# Patient Record
Sex: FEMALE | Race: ASIAN | NOT HISPANIC OR LATINO
[De-identification: names, ages, dates, MRNs, and addresses within clinical notes are randomized per-mention and may not be internally consistent; named-entity substitution may affect disease eponyms.]

---

## 2017-04-27 ENCOUNTER — APPOINTMENT (OUTPATIENT)
Dept: ORTHOPEDIC SURGERY | Facility: CLINIC | Age: 52
End: 2017-04-27

## 2017-04-27 RX ORDER — NAPROXEN 500 MG/1
500 TABLET ORAL TWICE DAILY
Qty: 60 | Refills: 0 | Status: ACTIVE | COMMUNITY
Start: 2017-04-27 | End: 1900-01-01

## 2017-06-01 ENCOUNTER — APPOINTMENT (OUTPATIENT)
Dept: ORTHOPEDIC SURGERY | Facility: CLINIC | Age: 52
End: 2017-06-01

## 2017-07-03 ENCOUNTER — APPOINTMENT (OUTPATIENT)
Dept: ORTHOPEDIC SURGERY | Facility: CLINIC | Age: 52
End: 2017-07-03

## 2017-07-03 DIAGNOSIS — M25.562 PAIN IN LEFT KNEE: ICD-10-CM

## 2017-07-06 ENCOUNTER — APPOINTMENT (OUTPATIENT)
Dept: MRI IMAGING | Facility: CLINIC | Age: 52
End: 2017-07-06

## 2017-07-06 ENCOUNTER — OUTPATIENT (OUTPATIENT)
Dept: OUTPATIENT SERVICES | Facility: HOSPITAL | Age: 52
LOS: 1 days | End: 2017-07-06
Payer: COMMERCIAL

## 2017-07-06 DIAGNOSIS — M25.562 PAIN IN LEFT KNEE: ICD-10-CM

## 2017-07-06 PROCEDURE — 73721 MRI JNT OF LWR EXTRE W/O DYE: CPT

## 2017-08-01 ENCOUNTER — APPOINTMENT (OUTPATIENT)
Dept: ORTHOPEDIC SURGERY | Facility: CLINIC | Age: 52
End: 2017-08-01
Payer: COMMERCIAL

## 2017-08-01 PROCEDURE — 99214 OFFICE O/P EST MOD 30 MIN: CPT | Mod: 25

## 2017-08-02 ENCOUNTER — OTHER (OUTPATIENT)
Age: 52
End: 2017-08-02

## 2017-10-09 ENCOUNTER — APPOINTMENT (OUTPATIENT)
Dept: ORTHOPEDIC SURGERY | Facility: CLINIC | Age: 52
End: 2017-10-09
Payer: COMMERCIAL

## 2017-10-09 PROCEDURE — 99213 OFFICE O/P EST LOW 20 MIN: CPT

## 2018-03-12 ENCOUNTER — APPOINTMENT (OUTPATIENT)
Dept: ORTHOPEDIC SURGERY | Facility: CLINIC | Age: 53
End: 2018-03-12
Payer: COMMERCIAL

## 2018-03-12 DIAGNOSIS — S83.232D COMPLEX TEAR OF MEDIAL MENISCUS, CURRENT INJURY, LEFT KNEE, SUBSEQUENT ENCOUNTER: ICD-10-CM

## 2018-03-12 PROCEDURE — 99213 OFFICE O/P EST LOW 20 MIN: CPT

## 2020-02-05 ENCOUNTER — NON-APPOINTMENT (OUTPATIENT)
Age: 55
End: 2020-02-05

## 2020-02-05 ENCOUNTER — APPOINTMENT (OUTPATIENT)
Dept: CARDIOLOGY | Facility: CLINIC | Age: 55
End: 2020-02-05
Payer: COMMERCIAL

## 2020-02-05 VITALS
WEIGHT: 136 LBS | RESPIRATION RATE: 18 BRPM | TEMPERATURE: 97.7 F | DIASTOLIC BLOOD PRESSURE: 75 MMHG | SYSTOLIC BLOOD PRESSURE: 115 MMHG | HEART RATE: 80 BPM | BODY MASS INDEX: 24.09 KG/M2 | OXYGEN SATURATION: 98 %

## 2020-02-05 DIAGNOSIS — R42 DIZZINESS AND GIDDINESS: ICD-10-CM

## 2020-02-05 DIAGNOSIS — R07.89 OTHER CHEST PAIN: ICD-10-CM

## 2020-02-05 PROCEDURE — 99204 OFFICE O/P NEW MOD 45 MIN: CPT | Mod: 25

## 2020-02-05 PROCEDURE — 93000 ELECTROCARDIOGRAM COMPLETE: CPT | Mod: 59

## 2020-02-05 NOTE — REASON FOR VISIT
[FreeTextEntry1] : 54 year-old female with HLD presents for evaluation of dizziness. Patient reports that she feels dizzy as if her head is not clear and whenever she turns her head she feel unwell. She had seen neurologist in 2011 and w/u was normal. Patient reports L sided CP and SOB with exertion that feels like tightness. Patient denies h/o syncope. Patient reports occasional palpitations lasting seconds. Patient reports that ever since she had nasal polyp removed she is breathing much better. She is not sure if she snores. I advised patient to undergo an echocardiogram and a treadmill stress test.

## 2020-02-05 NOTE — PHYSICAL EXAM
[General Appearance - Well Developed] : well developed [Normal Appearance] : normal appearance [Well Groomed] : well groomed [General Appearance - Well Nourished] : well nourished [No Deformities] : no deformities [General Appearance - In No Acute Distress] : no acute distress [Normal Conjunctiva] : the conjunctiva exhibited no abnormalities [Eyelids - No Xanthelasma] : the eyelids demonstrated no xanthelasmas [Normal Oral Mucosa] : normal oral mucosa [No Oral Pallor] : no oral pallor [No Oral Cyanosis] : no oral cyanosis [Normal Jugular Venous A Waves Present] : normal jugular venous A waves present [Normal Jugular Venous V Waves Present] : normal jugular venous V waves present [No Jugular Venous Mohamud A Waves] : no jugular venous mohamud A waves [Heart Rate And Rhythm] : heart rate and rhythm were normal [Heart Sounds] : normal S1 and S2 [Murmurs] : no murmurs present [Respiration, Rhythm And Depth] : normal respiratory rhythm and effort [Exaggerated Use Of Accessory Muscles For Inspiration] : no accessory muscle use [Auscultation Breath Sounds / Voice Sounds] : lungs were clear to auscultation bilaterally [Abdomen Soft] : soft [Abdomen Tenderness] : non-tender [Abdomen Mass (___ Cm)] : no abdominal mass palpated [Abnormal Walk] : normal gait [Gait - Sufficient For Exercise Testing] : the gait was sufficient for exercise testing [Nail Clubbing] : no clubbing of the fingernails [Cyanosis, Localized] : no localized cyanosis [Petechial Hemorrhages (___cm)] : no petechial hemorrhages [] : no ischemic changes [Oriented To Time, Place, And Person] : oriented to person, place, and time [Affect] : the affect was normal [Mood] : the mood was normal [No Anxiety] : not feeling anxious

## 2020-02-07 PROBLEM — R42 DIZZINESS: Status: ACTIVE | Noted: 2020-02-07

## 2020-02-22 ENCOUNTER — APPOINTMENT (OUTPATIENT)
Dept: CARDIOLOGY | Facility: CLINIC | Age: 55
End: 2020-02-22

## 2020-09-22 ENCOUNTER — APPOINTMENT (OUTPATIENT)
Dept: PULMONOLOGY | Facility: CLINIC | Age: 55
End: 2020-09-22

## 2020-10-08 ENCOUNTER — APPOINTMENT (OUTPATIENT)
Dept: PULMONOLOGY | Facility: CLINIC | Age: 55
End: 2020-10-08
Payer: COMMERCIAL

## 2020-10-08 VITALS
WEIGHT: 131 LBS | BODY MASS INDEX: 23.21 KG/M2 | DIASTOLIC BLOOD PRESSURE: 70 MMHG | OXYGEN SATURATION: 99 % | TEMPERATURE: 97.6 F | SYSTOLIC BLOOD PRESSURE: 105 MMHG | HEART RATE: 71 BPM | HEIGHT: 63 IN

## 2020-10-08 DIAGNOSIS — Z80.1 FAMILY HISTORY OF MALIGNANT NEOPLASM OF TRACHEA, BRONCHUS AND LUNG: ICD-10-CM

## 2020-10-08 DIAGNOSIS — G47.00 INSOMNIA, UNSPECIFIED: ICD-10-CM

## 2020-10-08 PROCEDURE — 99204 OFFICE O/P NEW MOD 45 MIN: CPT

## 2020-10-08 RX ORDER — CLONAZEPAM 0.5 MG/1
0.5 TABLET ORAL
Qty: 60 | Refills: 0 | Status: ACTIVE | COMMUNITY
Start: 2020-10-08 | End: 1900-01-01

## 2020-10-08 NOTE — DISCUSSION/SUMMARY
[FreeTextEntry1] : The patient has insomnia and anxiety.  Sleep hygiene techniques were discussed in detail.She was advised to try meditation and Yoga to help with relaxation.\par She will be given Clonazepam to help her sleep. She wants natural meds. Functional approach was discussed as well.

## 2020-10-08 NOTE — HISTORY OF PRESENT ILLNESS
[Never] : never [TextBox_4] : 55 year old lady of [place] descent here the evaluation of insomnia. She has insomnia for 25 years.  It began with a nasal block for many years. She had her nose operation in 2013. She now sleeps sleeps for 4 hours a night.  She goes to bed at 10, sometimes she falls asleep at 12 MN. She sometimes she does not sleep at all. She is very worried about not sleeping at all. She sleeps very lightly. She is worried about the economic effects of COVID.\par She might take short naps some days. \par She does not smoke or drink alcohol.\par She says little things bothers her a lot. She takes her mother's sleep medication she got from [place] on and off. she sleeps well with it. Her PMD gave her melatonin. She helps very little.\par She has stable weight.\par She does not exercise recently as she had hemorrhoidal surgery and she says it is infected.\par H/o hypothyroidism on medications.\par

## 2020-10-29 ENCOUNTER — APPOINTMENT (OUTPATIENT)
Dept: PULMONOLOGY | Facility: CLINIC | Age: 55
End: 2020-10-29

## 2025-05-13 ENCOUNTER — EMERGENCY (EMERGENCY)
Facility: HOSPITAL | Age: 60
LOS: 1 days | End: 2025-05-13
Attending: EMERGENCY MEDICINE
Payer: COMMERCIAL

## 2025-05-13 VITALS
TEMPERATURE: 99 F | RESPIRATION RATE: 16 BRPM | HEART RATE: 72 BPM | SYSTOLIC BLOOD PRESSURE: 155 MMHG | DIASTOLIC BLOOD PRESSURE: 88 MMHG | OXYGEN SATURATION: 98 %

## 2025-05-13 VITALS
DIASTOLIC BLOOD PRESSURE: 84 MMHG | SYSTOLIC BLOOD PRESSURE: 147 MMHG | TEMPERATURE: 99 F | HEART RATE: 69 BPM | WEIGHT: 132.94 LBS | RESPIRATION RATE: 18 BRPM | OXYGEN SATURATION: 97 % | HEIGHT: 63 IN

## 2025-05-13 PROCEDURE — 99284 EMERGENCY DEPT VISIT MOD MDM: CPT

## 2025-05-13 PROCEDURE — 96372 THER/PROPH/DIAG INJ SC/IM: CPT

## 2025-05-13 PROCEDURE — 99283 EMERGENCY DEPT VISIT LOW MDM: CPT | Mod: 25

## 2025-05-13 RX ORDER — KETOROLAC TROMETHAMINE 30 MG/ML
15 INJECTION, SOLUTION INTRAMUSCULAR; INTRAVENOUS ONCE
Refills: 0 | Status: DISCONTINUED | OUTPATIENT
Start: 2025-05-13 | End: 2025-05-13

## 2025-05-13 RX ORDER — IBUPROFEN 200 MG
1 TABLET ORAL
Qty: 28 | Refills: 0
Start: 2025-05-13 | End: 2025-05-19

## 2025-05-13 RX ORDER — METHYLPREDNISOLONE ACETATE 80 MG/ML
28 INJECTION, SUSPENSION INTRA-ARTICULAR; INTRALESIONAL; INTRAMUSCULAR; SOFT TISSUE ONCE
Refills: 0 | Status: DISCONTINUED | OUTPATIENT
Start: 2025-05-13 | End: 2025-05-13

## 2025-05-13 RX ORDER — METHYLPREDNISOLONE ACETATE 80 MG/ML
1 INJECTION, SUSPENSION INTRA-ARTICULAR; INTRALESIONAL; INTRAMUSCULAR; SOFT TISSUE
Qty: 1 | Refills: 0
Start: 2025-05-13 | End: 2025-05-18

## 2025-05-13 RX ADMIN — KETOROLAC TROMETHAMINE 15 MILLIGRAM(S): 30 INJECTION, SOLUTION INTRAMUSCULAR; INTRAVENOUS at 07:59

## 2025-05-13 NOTE — ED PROVIDER NOTE - CLINICAL SUMMARY MEDICAL DECISION MAKING FREE TEXT BOX
60-year-old female presenting with right lateral neck pain radiating to the right shoulder/right arm.  Patient with mild limitation range of motion however no tenderness to palpation of the neck or shoulder, no swelling or sensory deficits pulses intact.    Differential diagnosis includes but is not limited to cervical radiculopathy versus muscle spasm vs cervical strain. plan to tx with medrol dose pack and ibuprofen. pt advised to follow-up with primary care physician this week for further evaluation management.  Patient verbalized understanding of treatment plan and follow-up instructions.  Return precautions given.  All questions answered at this time.  Will medicate and reassess and likely discharge Ramos: 60-year-old female presenting with right lateral neck pain radiating to the right shoulder/right arm.  Patient with mild limitation range of motion however no tenderness to palpation of the neck or shoulder, no swelling or sensory deficits pulses intact.    Differential diagnosis includes but is not limited to cervical radiculopathy versus muscle spasm vs cervical strain. plan to tx with medrol dose pack and ibuprofen. pt advised to follow-up with primary care physician this week for further evaluation management.  Patient verbalized understanding of treatment plan and follow-up instructions.  Return precautions given.  All questions answered at this time.  Patient stable for dc.

## 2025-05-13 NOTE — ED ADULT NURSE NOTE - OBJECTIVE STATEMENT
pt is 60y F, no pertinent pmhx, c/o right shoulder pain, atraumatic, full strength, full ROM, pt 8/10 pain with motion, no chest pain or other symptoms, pt A&Ox4, ambulatory, updated on plan of care

## 2025-05-13 NOTE — ED ADULT NURSE NOTE - NSFALLUNIVINTERV_ED_ALL_ED
Bed/Stretcher in lowest position, wheels locked, appropriate side rails in place/Call bell, personal items and telephone in reach/Instruct patient to call for assistance before getting out of bed/chair/stretcher/Non-slip footwear applied when patient is off stretcher/[place] to call system/Physically safe environment - no spills, clutter or unnecessary equipment/Purposeful proactive rounding/Room/bathroom lighting operational, light cord in reach

## 2025-05-13 NOTE — ED ADULT TRIAGE NOTE - CHIEF COMPLAINT QUOTE
R arm pain x2 days s/p "sleeping in the car" after a long car ride. Denies any recent trauma or falls. Denies chest pain and dyspnea.

## 2025-05-13 NOTE — ED PROVIDER NOTE - OBJECTIVE STATEMENT
60-year-old female with a past medical history of hyperlipidemia and hypothyroidism who presents for evaluation of right lateral neck pain radiating to the right shoulder/armpit onset 2 days ago.  Patient states that she was sleeping w/ her neck at an angle during a long car ride and the pain began after.  Patient has not taking anything for the pain.  Patient denies numbness and tingling, swelling, weakness in the hand.  Patient states the pain causes some limited range of motion of the shoulder and the neck.

## 2025-05-13 NOTE — ED PROVIDER NOTE - PATIENT PORTAL LINK FT
You can access the FollowMyHealth Patient Portal offered by [facility] by registering at the following website: [email or web address]. By joining [employer]’s FollowMyHealth portal, you will also be able to view your health information using other applications (apps) compatible with our system.

## 2025-05-13 NOTE — ED PROVIDER NOTE - PHYSICAL EXAMINATION
Supratherapeutic INR
General: WN/WD   Head: Normocephalic Atraumatic  Eyes: EOM grossly in tact, no scleral icterus  ENT: moist mucous membranes, neck supple   CV: Extremities warm and well perfused  Abdominal: Soft, non-distended  Respiratory: normal respiratory effort  Neuro: A&Ox3  Extremities: no tenderness to palpation of the right shoulder, no midline or paraspinal ttp, limited range of motion of neck to the left 2/2 pain, right arm abduction limited to 90 degrees 2/2 pain, flexion and extension of neck and right arm intact, RUE strength 5/5, no edema, RUE NVI, no sensory deficits, left arm ROM and strength 5/5.   Skin: No visible rashes

## 2025-05-13 NOTE — ED PROVIDER NOTE - NSFOLLOWUPINSTRUCTIONS_ED_ALL_ED_FT
You were seen in the emergency room for right neck and shoulder pain.    You may have a pinched nerve in the neck or a muscle strain.    We sent steroids to your pharmacy please start taking this tomorrow 5/14.  Please follow all package instructions on how to take the medication    We also sent ibuprofen to your pharmacy you may take this every 6 hours as needed for pain.    Please follow-up with your primary care physician this week for further evaluation and management    Please return to the emergency room if you have any new or worsening symptoms such as increasing pain numbness or tingling swelling or limitations to range of motion.